# Patient Record
Sex: MALE | Race: ASIAN | NOT HISPANIC OR LATINO | URBAN - METROPOLITAN AREA
[De-identification: names, ages, dates, MRNs, and addresses within clinical notes are randomized per-mention and may not be internally consistent; named-entity substitution may affect disease eponyms.]

---

## 2023-07-08 ENCOUNTER — EMERGENCY (EMERGENCY)
Facility: HOSPITAL | Age: 38
LOS: 1 days | Discharge: HOME | End: 2023-07-08
Attending: PERSONAL EMERGENCY RESPONSE ATTENDANT
Payer: COMMERCIAL

## 2023-07-08 VITALS
RESPIRATION RATE: 18 BRPM | TEMPERATURE: 98 F | HEART RATE: 72 BPM | OXYGEN SATURATION: 100 % | SYSTOLIC BLOOD PRESSURE: 122 MMHG | DIASTOLIC BLOOD PRESSURE: 84 MMHG

## 2023-07-08 VITALS
WEIGHT: 160.06 LBS | RESPIRATION RATE: 18 BRPM | HEART RATE: 75 BPM | HEIGHT: 67 IN | SYSTOLIC BLOOD PRESSURE: 122 MMHG | OXYGEN SATURATION: 96 % | TEMPERATURE: 98 F | DIASTOLIC BLOOD PRESSURE: 88 MMHG

## 2023-07-08 PROCEDURE — 99284 EMERGENCY DEPT VISIT MOD MDM: CPT | Mod: 25

## 2023-07-08 PROCEDURE — 12002 RPR S/N/AX/GEN/TRNK2.6-7.5CM: CPT

## 2023-07-08 PROCEDURE — 90471 IMMUNIZATION ADMIN: CPT

## 2023-07-08 PROCEDURE — 90715 TDAP VACCINE 7 YRS/> IM: CPT

## 2023-07-08 PROCEDURE — 99283 EMERGENCY DEPT VISIT LOW MDM: CPT | Mod: 25

## 2023-07-08 RX ORDER — TETANUS TOXOID, REDUCED DIPHTHERIA TOXOID AND ACELLULAR PERTUSSIS VACCINE, ADSORBED 5; 2.5; 8; 8; 2.5 [IU]/.5ML; [IU]/.5ML; UG/.5ML; UG/.5ML; UG/.5ML
0.5 SUSPENSION INTRAMUSCULAR ONCE
Refills: 0 | Status: COMPLETED | OUTPATIENT
Start: 2023-07-08 | End: 2023-07-08

## 2023-07-08 RX ADMIN — TETANUS TOXOID, REDUCED DIPHTHERIA TOXOID AND ACELLULAR PERTUSSIS VACCINE, ADSORBED 0.5 MILLILITER(S): 5; 2.5; 8; 8; 2.5 SUSPENSION INTRAMUSCULAR at 07:44
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
## 2023-07-08 NOTE — ED ADULT NURSE NOTE - NSFALLUNIVINTERV_ED_ALL_ED
Bed/Stretcher in lowest position, wheels locked, appropriate side rails in place/Call bell, personal items and telephone in reach/Instruct patient to call for assistance before getting out of bed/chair/stretcher/Non-slip footwear applied when patient is off stretcher/Athena to call system/Physically safe environment - no spills, clutter or unnecessary equipment/Purposeful proactive rounding/Room/bathroom lighting operational, light cord in reach

## 2023-07-08 NOTE — ED PROVIDER NOTE - PATIENT PORTAL LINK FT
You can access the FollowMyHealth Patient Portal offered by [facility] by registering at the following website: [email or web address]. By joining [employer]’s FollowMyHealth portal, you will also be able to view your health information using other applications (apps) compatible with our system.

## 2023-07-08 NOTE — ED PROVIDER NOTE - NSFOLLOWUPINSTRUCTIONS_ED_ALL_ED_FT
You were seen in the emergency department today for a wound on your left forearm. The wound was cleaned and 7 sutures were put in. These sutures will dissolve on their own in 7 to 10 days. When they start to come apart you can pull them out with a pair of tweezers or something similar. If the sutures start to come out and the wound hasn't started to heal then please come back to the emergency department.     A small amount of red around the wound is normal. However, if you start to see redness in a line going up your arm then please come back in to be seen. If the wound starts to swell up or you start to see draining pus, then please come back to the emergency department. You were seen in the emergency department today for a wound on your left forearm. The wound was cleaned and 7 sutures were put in. These sutures will need to be cut out in about 10 days. You can go to an urgent care, to your primary care provider, or come back here to have these taken out.     A small amount of red around the wound is normal. However, if you start to see redness in a line going up your arm or start to have fevers then please come back in to be seen. If the wound starts to swell up or you start to see draining pus, then please come back to the emergency department.

## 2023-07-08 NOTE — ED PROVIDER NOTE - ATTENDING CONTRIBUTION TO CARE
Attending MD Shaw:  I performed a history and physical exam of the patient and discussed their management with the resident. I reviewed the resident's note and agree with the documented findings and plan of care. My medical decision making and observations are found above.

## 2023-07-08 NOTE — ED PROVIDER NOTE - PROGRESS NOTE DETAILS
Miguel Schilling MD PGY-1: Pt signed out to me. Reassessed. Has 3cm linear left forearm laceration. Will lac repair. Offered pain medication. Pt declined. Will give Tdap as pt does not remember last time he had.
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
## 2023-07-08 NOTE — ED PROVIDER NOTE - PHYSICAL EXAMINATION
L forearm with ~3cm linear laceration with mild gaping, active small volume venous ooze well controlled, nvsc intact distal to site
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
## 2023-07-08 NOTE — ED ADULT TRIAGE NOTE - BP NONINVASIVE SYSTOLIC (MM HG)
122 Billing Type: Third-Party Bill Expected Date Of Service: 04/27/2023 Lab Facility: 0 Bill For Surgical Tray: no Performing Laboratory: [id number]

## 2023-07-19 ENCOUNTER — EMERGENCY (EMERGENCY)
Facility: HOSPITAL | Age: 38
LOS: 1 days | Discharge: HOME | End: 2023-07-19
Payer: COMMERCIAL

## 2023-07-19 VITALS
DIASTOLIC BLOOD PRESSURE: 92 MMHG | OXYGEN SATURATION: 98 % | WEIGHT: 160.06 LBS | RESPIRATION RATE: 18 BRPM | HEIGHT: 67 IN | SYSTOLIC BLOOD PRESSURE: 129 MMHG | HEART RATE: 86 BPM | TEMPERATURE: 98 F

## 2023-07-19 VITALS
OXYGEN SATURATION: 97 % | SYSTOLIC BLOOD PRESSURE: 126 MMHG | RESPIRATION RATE: 18 BRPM | HEART RATE: 87 BPM | DIASTOLIC BLOOD PRESSURE: 86 MMHG | TEMPERATURE: 98 F

## 2023-07-19 PROBLEM — Z78.9 OTHER SPECIFIED HEALTH STATUS: Chronic | Status: ACTIVE | Noted: 2023-07-08

## 2023-07-19 PROCEDURE — G0463: CPT

## 2023-07-19 PROCEDURE — L9995: CPT

## 2023-07-19 NOTE — ED PROVIDER NOTE - CLINICAL SUMMARY MEDICAL DECISION MAKING FREE TEXT BOX
Suture/Staple Removal    After having your stitches or staples removed it is typical to have minor discomfort, swelling, or redness in the area. The wound is still healing so continue to protect it from injury. Keep the wound dry and if given creams, ointments, or medication, take as instructed to by your health care professional.    SEEK IMMEDIATE MEDICAL CARE IF YOU HAVE ANY OF THE FOLLOWING SYMPTOMS: increasing redness/swelling/pain in the wound, pus coming from the wound, bad smell coming from the wound, or fever.    You may apply Neosporin ointment to the wound once a day for the next week.

## 2023-07-19 NOTE — ED ADULT NURSE NOTE - OBJECTIVE STATEMENT
36 y/o M presented to ED for staple removal, pt states it has been 10 days since he got staples, wound has healed well, denies any redness, swelling or discharge. Staple site appears clean, appropriate skin color, no warmth or redness. Pt denies any medical complaints at this time. A&Ox4, breathing spontaneously and unlabored, speaking full sentences, moving all extremities easily, is ambulatory.

## 2023-07-19 NOTE — ED PROVIDER NOTE - OBJECTIVE STATEMENT
37-year-old male here for suture removal left forearm.  Patient was seen 11 days ago and had sutures placed.  Per procedure note there were 7 sutures placed, per my exam 8 sutures are in place and removed.    No weakness, no numbness, no discharge from wound.

## 2023-07-19 NOTE — ED PROVIDER NOTE - PHYSICAL EXAMINATION
Gen:  Well appearing in NAD.  Head:  NC/AT.  Resp: No distress.  Ext: no deformities.    Skin: warm and dry as visualized.  8 simple interrupted sutures in place on the dorsal left forearm    Neuro: alert and oriented to person, place, time.

## 2023-07-19 NOTE — ED PROVIDER NOTE - PATIENT PORTAL LINK FT
You can access the FollowMyHealth Patient Portal offered by [facility] by registering at the following website: [email or web address]. By joining [employer]’s FollowMyHealth portal, you will also be able to view your health information using other applications (apps) compatible with our system.
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
